# Patient Record
Sex: FEMALE | Race: WHITE | Employment: FULL TIME | URBAN - METROPOLITAN AREA
[De-identification: names, ages, dates, MRNs, and addresses within clinical notes are randomized per-mention and may not be internally consistent; named-entity substitution may affect disease eponyms.]

---

## 2023-02-02 ENCOUNTER — APPOINTMENT (OUTPATIENT)
Dept: GENERAL RADIOLOGY | Age: 23
End: 2023-02-02
Payer: COMMERCIAL

## 2023-02-02 ENCOUNTER — HOSPITAL ENCOUNTER (EMERGENCY)
Age: 23
Discharge: HOME | End: 2023-02-02
Attending: EMERGENCY MEDICINE
Payer: COMMERCIAL

## 2023-02-02 VITALS
HEIGHT: 62 IN | BODY MASS INDEX: 17.85 KG/M2 | DIASTOLIC BLOOD PRESSURE: 79 MMHG | WEIGHT: 97 LBS | RESPIRATION RATE: 18 BRPM | SYSTOLIC BLOOD PRESSURE: 126 MMHG | OXYGEN SATURATION: 99 % | TEMPERATURE: 98.1 F | HEART RATE: 73 BPM

## 2023-02-02 DIAGNOSIS — R42 LIGHTHEADEDNESS: Primary | ICD-10-CM

## 2023-02-02 LAB
ALBUMIN SERPL-MCNC: 4.3 G/DL (ref 3.5–5)
ALBUMIN/GLOB SERPL: 1.1 (ref 0.4–1.6)
ALP SERPL-CCNC: 73 U/L (ref 50–130)
ALT SERPL-CCNC: 21 U/L (ref 12–65)
ANION GAP SERPL CALC-SCNC: 1 MMOL/L (ref 2–11)
AST SERPL-CCNC: 22 U/L (ref 15–37)
BASOPHILS # BLD: 0.1 K/UL (ref 0–0.2)
BASOPHILS NFR BLD: 1 % (ref 0–2)
BILIRUB SERPL-MCNC: 0.3 MG/DL (ref 0.2–1.1)
BUN SERPL-MCNC: 12 MG/DL (ref 6–23)
CALCIUM SERPL-MCNC: 9.9 MG/DL (ref 8.3–10.4)
CHLORIDE SERPL-SCNC: 106 MMOL/L (ref 101–110)
CO2 SERPL-SCNC: 30 MMOL/L (ref 21–32)
CREAT SERPL-MCNC: 0.7 MG/DL (ref 0.6–1)
DIFFERENTIAL METHOD BLD: ABNORMAL
EKG ATRIAL RATE: 64 BPM
EKG DIAGNOSIS: NORMAL
EKG P AXIS: 72 DEGREES
EKG P-R INTERVAL: 136 MS
EKG Q-T INTERVAL: 404 MS
EKG QRS DURATION: 93 MS
EKG QTC CALCULATION (BAZETT): 414 MS
EKG R AXIS: 90 DEGREES
EKG T AXIS: 60 DEGREES
EKG VENTRICULAR RATE: 63 BPM
EOSINOPHIL # BLD: 0.6 K/UL (ref 0–0.8)
EOSINOPHIL NFR BLD: 7 % (ref 0.5–7.8)
ERYTHROCYTE [DISTWIDTH] IN BLOOD BY AUTOMATED COUNT: 13 % (ref 11.9–14.6)
GLOBULIN SER CALC-MCNC: 4 G/DL (ref 2.8–4.5)
GLUCOSE SERPL-MCNC: 93 MG/DL (ref 65–100)
HCG UR QL: NEGATIVE
HCT VFR BLD AUTO: 40 % (ref 35.8–46.3)
HGB BLD-MCNC: 12.8 G/DL (ref 11.7–15.4)
IMM GRANULOCYTES # BLD AUTO: 0 K/UL (ref 0–0.5)
IMM GRANULOCYTES NFR BLD AUTO: 0 % (ref 0–5)
LYMPHOCYTES # BLD: 3.5 K/UL (ref 0.5–4.6)
LYMPHOCYTES NFR BLD: 46 % (ref 13–44)
MCH RBC QN AUTO: 29.5 PG (ref 26.1–32.9)
MCHC RBC AUTO-ENTMCNC: 32 G/DL (ref 31.4–35)
MCV RBC AUTO: 92.2 FL (ref 82–102)
MONOCYTES # BLD: 0.5 K/UL (ref 0.1–1.3)
MONOCYTES NFR BLD: 7 % (ref 4–12)
NEUTS SEG # BLD: 2.9 K/UL (ref 1.7–8.2)
NEUTS SEG NFR BLD: 39 % (ref 43–78)
NRBC # BLD: 0 K/UL (ref 0–0.2)
PLATELET # BLD AUTO: 337 K/UL (ref 150–450)
PMV BLD AUTO: 10 FL (ref 9.4–12.3)
POTASSIUM SERPL-SCNC: 3.6 MMOL/L (ref 3.5–5.1)
PROT SERPL-MCNC: 8.3 G/DL (ref 6.3–8.2)
RBC # BLD AUTO: 4.34 M/UL (ref 4.05–5.2)
SODIUM SERPL-SCNC: 137 MMOL/L (ref 133–143)
TSH W FREE THYROID IF ABNORMAL: 2.36 UIU/ML (ref 0.36–3.74)
WBC # BLD AUTO: 7.6 K/UL (ref 4.3–11.1)

## 2023-02-02 PROCEDURE — 94761 N-INVAS EAR/PLS OXIMETRY MLT: CPT

## 2023-02-02 PROCEDURE — 84443 ASSAY THYROID STIM HORMONE: CPT

## 2023-02-02 PROCEDURE — 85025 COMPLETE CBC W/AUTO DIFF WBC: CPT

## 2023-02-02 PROCEDURE — 99285 EMERGENCY DEPT VISIT HI MDM: CPT

## 2023-02-02 PROCEDURE — 2580000003 HC RX 258

## 2023-02-02 PROCEDURE — 93005 ELECTROCARDIOGRAM TRACING: CPT | Performed by: EMERGENCY MEDICINE

## 2023-02-02 PROCEDURE — 71045 X-RAY EXAM CHEST 1 VIEW: CPT

## 2023-02-02 PROCEDURE — 80053 COMPREHEN METABOLIC PANEL: CPT

## 2023-02-02 PROCEDURE — 96360 HYDRATION IV INFUSION INIT: CPT

## 2023-02-02 PROCEDURE — 81025 URINE PREGNANCY TEST: CPT

## 2023-02-02 RX ORDER — 0.9 % SODIUM CHLORIDE 0.9 %
1000 INTRAVENOUS SOLUTION INTRAVENOUS ONCE
Status: COMPLETED | OUTPATIENT
Start: 2023-02-02 | End: 2023-02-02

## 2023-02-02 RX ADMIN — SODIUM CHLORIDE 1000 ML: 900 INJECTION, SOLUTION INTRAVENOUS at 17:44

## 2023-02-02 ASSESSMENT — PAIN SCALES - GENERAL: PAINLEVEL_OUTOF10: 1

## 2023-02-02 ASSESSMENT — ENCOUNTER SYMPTOMS
NAUSEA: 0
DIARRHEA: 0
VOMITING: 0
COLOR CHANGE: 0
ABDOMINAL PAIN: 0
CHEST TIGHTNESS: 0
SHORTNESS OF BREATH: 0

## 2023-02-02 ASSESSMENT — PAIN - FUNCTIONAL ASSESSMENT: PAIN_FUNCTIONAL_ASSESSMENT: 0-10

## 2023-02-02 NOTE — ED PROVIDER NOTES
Emergency Department Provider Note                   PCP:                None Provider               Age: 20 y.o. Sex: female       ICD-10-CM    1. Lightheadedness  [facility] - [address] Decision To Discharge 02/02/2023 06:31:39 PM        Medical Decision Making  29-year-old  female with history of anxiety presenting for evaluation of lightheadedness for the past several weeks. 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 States she occasionally feels some prodromal symptoms and presyncope but has not actually lost consciousness. Vital stable upon arrival.  Exam unremarkable. No neurological deficits. She is well-appearing and in no acute distress. Orthostatic vitals normal.  We will check EKG to rule out arrhythmia or ischemia, check basic labs to rule out metabolic derangement, check UA and hCG. We will also check TSH given prodromal hot flashes and cold intolerance. Do not believe there is any indication for imaging currently given normal vitals, neuro exam, timeline of symptoms, prodromal symptoms. We will reevaluate this decision after labs. Patient's lab work ultimately unrevealing. hCG negative. TSH normal.  UA normal.  EKG normal.  Patient was given fluids and is feeling somewhat better. Unclear etiology as to symptoms. Potentially vasovagal, transient hypoglycemia, dehydration. Low suspicion for intracranial process therefore we will defer imaging at this time. Advised adequate hydration, frequent meals, follow-up with primary care. Return precautions discussed. Patient verbalizes understanding and is agreeable with this plan. Amount and/or Complexity of Data Reviewed  Labs: ordered. Radiology: ordered and independent interpretation performed.      Details: Agree with radiologist - no acute abnormalities  ECG/medicine tests: ordered and independent interpretation performed. Details: EKG interpretation:  Normal sinus rhythm at a ventricular rate of 63. Normal axis. Normal QTc. Normal ST-T segments. Risk  Prescription drug management. Complexity of Problem: 1 stable, acute illness. (3)    I have conducted an independent ordering and review of Labs. I have conducted an independent ordering and review of EKG. I have conducted an independent ordering and review of X-rays. Considerations: Shared decision making was utilized in the care of this patient. Considerations: The following labs and/or imaging studies were considered but not ordered: CT head      ED Course as of 02/02/23 1845   Thu Feb 02, 2023   1640 EKG interpretation:  Normal sinus rhythm at a ventricular rate of 63. Normal axis. Normal QTc. Normal ST-T segments. [LB]      ED Course User Index  [LB] CHEYENNE Davis        Orders Placed This Encounter   Procedures    XR CHEST PORTABLE    CBC with Auto Differential    Comprehensive Metabolic Panel    TSH with Reflex    [id number] [facility] - [address], [place]    Cardiac Monitor - ED Only    Continuous Pulse Oximetry    Orthostatic blood pressure and pulse    POC Urine Preg    POC Pregnancy Urine Qual    EKG 12 Lead    Saline lock IV        Medications   0.9 % sodium chloride bolus (0 mLs IntraVENous Stopped 2/2/23 1844)       New Prescriptions    No medications on file        Rubén Park is a 20 y.o. female who presents to the Emergency Department with chief complaint of    Chief Complaint   Patient presents with    Dizziness      Patient is a 50-year-old  male with history of anxiety presenting to the emergency department for evaluation of lightheadedness. States she has been feeling some lightheadedness with prolonged standing for the past several weeks. 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   States she occasionally feels some prodromal \"hot flashes\" and vision darkening and states occasionally she feels she is going to pass out but has not fully lost consciousness. States she usually has to sit down and her symptoms resolved. States she initially thought she may have low blood sugar therefore she has been eating more frequently lately which has not helped. She denies any vertigo or disequilibrium. Denies any weakness. She denies fever or chills. She denies any other exacerbating or relieving factors. She has no other complaints today. The history is provided by the patient. Review of Systems   Constitutional:  Negative for chills, fatigue and fever. Eyes:  Negative for visual disturbance. Respiratory:  Negative for chest tightness and shortness of breath. Cardiovascular:  Negative for chest pain and palpitations. Gastrointestinal:  Negative for abdominal pain, diarrhea, nausea and vomiting. Genitourinary:  Negative for dysuria. Musculoskeletal:  Negative for arthralgias and myalgias. Skin:  Negative for color change and wound. Neurological:  Positive for light-headedness. Negative for dizziness, syncope, weakness and headaches. All other systems reviewed and are negative. Past Medical History:   Diagnosis Date    ADHD     Anxiety     Panic attacks         History reviewed. No pertinent surgical history. History reviewed. No pertinent family history. Social History     Socioeconomic History    Marital status: Single     Spouse name: None    Number of children: None    Years of education: None    Highest education level: None         Benadryl [diphenhydramine]     Previous Medications    No medications on file        Vitals signs and nursing note reviewed.    Patient Vitals for the past 4 hrs:   Temp Pulse Resp BP SpO2   02/02/23 1830 -- 73 18 126/79 99 %   02/02/23 1732 -- 58 16 108/74 --   02/02/23 1731 -- 57 15 110/80 100 %   02/02/23 1620 98.1 °F (36.7 °C) 68 16 121/67 98 %          Physical Exam  Vitals and nursing note reviewed. Constitutional:       General: She is not in acute distress. Appearance: Normal appearance. She is not ill-appearing. HENT:      Head: Normocephalic and atraumatic. Right Ear: External ear normal.      Left Ear: External ear normal.      Nose: Nose normal.   Eyes:      General: No scleral icterus. Right eye: No discharge. Left eye: No discharge. Extraocular Movements: Extraocular movements intact. Conjunctiva/sclera: Conjunctivae normal.      Pupils: Pupils are equal, round, and reactive to light. Cardiovascular:      Rate and Rhythm: Normal rate and regular rhythm. Pulses: Normal pulses. Heart sounds: Normal heart sounds. Pulmonary:      Effort: Pulmonary effort is normal.      Breath sounds: Normal breath sounds. Abdominal:      General: Abdomen is flat. Palpations: Abdomen is soft. Tenderness: There is no abdominal tenderness. Musculoskeletal:         General: Normal range of motion. Cervical back: Normal range of motion and neck supple. Skin:     General: Skin is warm and dry. Neurological:      General: No focal deficit present. Mental Status: She is alert and oriented to person, place, and time. Cranial Nerves: No cranial nerve deficit. Sensory: No sensory deficit. Motor: No weakness. Coordination: Coordination normal.      Gait: Gait normal.   Psychiatric:         Mood and Affect: Mood normal.         Behavior: Behavior normal.        Procedures    Results for orders placed or performed during the hospital encounter of 02/02/23   XR CHEST PORTABLE    Narrative    Chest X-ray    INDICATION: Dizziness, syncope    AP/PA view of the chest was obtained. FINDINGS: The lungs are clear. There are no infiltrates or effusions. The heart  size is normal.  The bony thorax is intact.         Impression    No acute findings in the chest     CBC with Auto Differential   Result Value Ref Range    WBC 7.6 4.3 - 11.1 K/uL    RBC 4.34 4.05 - 5.2 M/uL    Hemoglobin 12.8 11.7 - 15.4 g/dL    Hematocrit 40.0 35.8 - 46.3 %    MCV 92.2 82.0 - 102.0 FL    MCH 29.5 26.1 - 32.9 PG    MCHC 32.0 31.4 - 35.0 g/dL    RDW 13.0 11.9 - 14.6 %    Platelets 709 442 - 773 K/uL    MPV 10.0 9.4 - 12.3 FL    nRBC 0.00 0.0 - 0.2 K/uL    Differential Type AUTOMATED      Seg Neutrophils 39 (L) 43 - 78 %    Lymphocytes 46 (H) 13 - 44 %    Monocytes 7 4.0 - 12.0 %    Eosinophils % 7 0.5 - 7.8 %    Basophils 1 0.0 - 2.0 %    Immature Granulocytes 0 0.0 - 5.0 %    Segs Absolute 2.9 1.7 - 8.2 K/UL    Absolute Lymph # 3.5 0.5 - 4.6 K/UL    Absolute Mono # 0.5 0.1 - 1.3 K/UL    Absolute Eos # 0.6 0.0 - 0.8 K/UL    Basophils Absolute 0.1 0.0 - 0.2 K/UL    Absolute Immature Granulocyte 0.0 0.0 - 0.5 K/UL   Comprehensive Metabolic Panel   Result Value Ref Range    Sodium 137 133 - 143 mmol/L    Potassium 3.6 3.5 - 5.1 mmol/L    Chloride 106 101 - 110 mmol/L    CO2 30 21 - 32 mmol/L    Anion Gap 1 (L) 2 - 11 mmol/L    Glucose 93 65 - 100 mg/dL    BUN 12 6 - 23 MG/DL    Creatinine 0.70 0.6 - 1.0 MG/DL    Est, Glom Filt Rate >60 >60 ml/min/1.73m2    Calcium 9.9 8.3 - 10.4 MG/DL    Total Bilirubin 0.3 0.2 - 1.1 MG/DL    ALT 21 12 - 65 U/L    AST 22 15 - 37 U/L    Alk Phosphatase 73 50 - 130 U/L    Total Protein 8.3 (H) 6.3 - 8.2 g/dL    Albumin 4.3 3.5 - 5.0 g/dL    Globulin 4.0 2.8 - 4.5 g/dL    Albumin/Globulin Ratio 1.1 0.4 - 1.6     TSH with Reflex   Result Value Ref Range    TSH w Free Thyroid if Abnormal 2.36 0.358 - 3.740 UIU/ML   POC Pregnancy Urine Qual   Result Value Ref Range    Preg Test, Ur Negative NEG          XR CHEST PORTABLE   Final Result   No acute findings in the chest              NIH Stroke Scale  Interval: Baseline  Level of Consciousness (1a): Alert  LOC Questions (1b):  Answers both correctly  LOC Commands (1c): Performs both tasks correctly  Best Gaze (2): Normal  Visual (3): No visual loss  Facial Palsy (4): Normal symmetrical movement  Motor Arm, Left (5a): No drift  Motor Arm, Right (5b): No drift  Motor Leg, Left (6a): No drift  Motor Leg, Right (6b): No drift  Limb Ataxia (7): Absent  Sensory (8): Normal  Best Language (9): No aphasia  Dysarthria (10): Normal  Extinction and Inattention (11): No abnormality  Total: 0              Voice dictation software was used during the making of this note. This software is not perfect and grammatical and other typographical errors may be present. This note has not been completely proofread for errors.       Haylee Herman  02/02/23 9029

## 2023-02-02 NOTE — ED NOTES
I have reviewed discharge instructions with the patient. The patient verbalized understanding. Patient left ED via Discharge Method: ambulatory to Home with self    Opportunity for questions and clarification provided. Patient given 0 scripts. To continue your aftercare when you leave the hospital, you may receive an automated call from our care team to check in on how you are doing. This is a free service and part of our promise to provide the best care and service to meet your aftercare needs.  If you have questions, or wish to unsubscribe from this service please call [phone]. Thank you for Choosing our [facility].         [address] [place] [address]  02/02/23 1335

## 2023-02-02 NOTE — DISCHARGE INSTRUCTIONS
You were evaluated today in the emergency department with lightheadedness. Vital signs today were normal.  Your blood work today looked good and did not show any signs of anemia, infection, organ dysfunction, or electrolyte disturbance. Your thyroid level is normal.  Your pregnancy test was normal.  No signs of urinary tract infection. Your EKG looked good and did not show any signs of blockages or arrhythmia. Your blood pressure did not drop when standing. We gave you some fluids in the emergency department. Unclear as to cause of your symptoms. Advise following up with primary care. I placed a referral to [facility] primary care on [address]. They should reach out to you and give you a call. This may expedite an appointment to primary care, however, it may not. Plan to keep your appointment in March for now. For now, be sure to hydrate well and eat small snacks throughout the day. Please return with any worsening symptoms or concerns otherwise.

## 2023-02-23 ENCOUNTER — OFFICE VISIT (OUTPATIENT)
Dept: PRIMARY CARE CLINIC | Facility: CLINIC | Age: 23
End: 2023-02-23
Payer: COMMERCIAL

## 2023-02-23 VITALS
HEART RATE: 91 BPM | WEIGHT: 101 LBS | SYSTOLIC BLOOD PRESSURE: 107 MMHG | DIASTOLIC BLOOD PRESSURE: 80 MMHG | BODY MASS INDEX: 18.58 KG/M2 | OXYGEN SATURATION: 96 % | HEIGHT: 62 IN

## 2023-02-23 DIAGNOSIS — R00.0 TACHYCARDIA: ICD-10-CM

## 2023-02-23 DIAGNOSIS — H53.8 BLURRY VISION: ICD-10-CM

## 2023-02-23 DIAGNOSIS — R42 DIZZINESS: Primary | ICD-10-CM

## 2023-02-23 DIAGNOSIS — Z76.89 ENCOUNTER TO ESTABLISH CARE WITH NEW DOCTOR: ICD-10-CM

## 2023-02-23 DIAGNOSIS — R23.2 ABNORMAL FLUSHING AND SWEATING: ICD-10-CM

## 2023-02-23 DIAGNOSIS — R61 ABNORMAL FLUSHING AND SWEATING: ICD-10-CM

## 2023-02-23 PROCEDURE — 99203 OFFICE O/P NEW LOW 30 MIN: CPT | Performed by: FAMILY MEDICINE

## 2023-02-23 RX ORDER — BLOOD PRESSURE TEST KIT
1 KIT MISCELLANEOUS DAILY
Qty: 1 KIT | Refills: 0 | Status: SHIPPED | OUTPATIENT
Start: 2023-02-23

## 2023-02-23 SDOH — ECONOMIC STABILITY: INCOME INSECURITY: HOW HARD IS IT FOR YOU TO PAY FOR THE VERY BASICS LIKE FOOD, HOUSING, MEDICAL CARE, AND HEATING?: SOMEWHAT HARD

## 2023-02-23 SDOH — ECONOMIC STABILITY: FOOD INSECURITY: WITHIN THE PAST 12 MONTHS, YOU WORRIED THAT YOUR FOOD WOULD RUN OUT BEFORE YOU GOT MONEY TO BUY MORE.: SOMETIMES TRUE

## 2023-02-23 SDOH — ECONOMIC STABILITY: HOUSING INSECURITY
IN THE LAST 12 MONTHS, WAS THERE A TIME WHEN YOU DID NOT HAVE A STEADY PLACE TO SLEEP OR SLEPT IN A SHELTER (INCLUDING NOW)?: NO

## 2023-02-23 SDOH — ECONOMIC STABILITY: FOOD INSECURITY: WITHIN THE PAST 12 MONTHS, THE FOOD YOU BOUGHT JUST DIDN'T LAST AND YOU DIDN'T HAVE MONEY TO GET MORE.: NEVER TRUE

## 2023-02-23 ASSESSMENT — ENCOUNTER SYMPTOMS
SHORTNESS OF BREATH: 0
NAUSEA: 0
CHEST TIGHTNESS: 0
VOMITING: 0
EYE PAIN: 0
SINUS PAIN: 0
CONSTIPATION: 0
RHINORRHEA: 0
BLOOD IN STOOL: 0
ABDOMINAL PAIN: 0
EYE REDNESS: 0
SINUS PRESSURE: 0
SORE THROAT: 0
DIARRHEA: 0
BACK PAIN: 0
WHEEZING: 0
COUGH: 0

## 2023-02-23 ASSESSMENT — PATIENT HEALTH QUESTIONNAIRE - PHQ9
6. FEELING BAD ABOUT YOURSELF - OR THAT YOU ARE A FAILURE OR HAVE LET YOURSELF OR YOUR FAMILY DOWN: 0
4. FEELING TIRED OR HAVING LITTLE ENERGY: 3
SUM OF ALL RESPONSES TO PHQ QUESTIONS 1-9: 16
SUM OF ALL RESPONSES TO PHQ QUESTIONS 1-9: 16
5. POOR APPETITE OR OVEREATING: 1
2. FEELING DOWN, DEPRESSED OR HOPELESS: 1
10. IF YOU CHECKED OFF ANY PROBLEMS, HOW DIFFICULT HAVE THESE PROBLEMS MADE IT FOR YOU TO DO YOUR WORK, TAKE CARE OF THINGS AT HOME, OR GET ALONG WITH OTHER PEOPLE: 2
1. LITTLE INTEREST OR PLEASURE IN DOING THINGS: 2
8. MOVING OR SPEAKING SO SLOWLY THAT OTHER PEOPLE COULD HAVE NOTICED. OR THE OPPOSITE, BEING SO FIGETY OR RESTLESS THAT YOU HAVE BEEN MOVING AROUND A LOT MORE THAN USUAL: 3
9. THOUGHTS THAT YOU WOULD BE BETTER OFF DEAD, OR OF HURTING YOURSELF: 0
7. TROUBLE CONCENTRATING ON THINGS, SUCH AS READING THE NEWSPAPER OR WATCHING TELEVISION: 3
SUM OF ALL RESPONSES TO PHQ9 QUESTIONS 1 & 2: 3
SUM OF ALL RESPONSES TO PHQ QUESTIONS 1-9: 16
3. TROUBLE FALLING OR STAYING ASLEEP: 3
SUM OF ALL RESPONSES TO PHQ QUESTIONS 1-9: 16

## 2023-02-23 NOTE — PROGRESS NOTES
[facility]  [address] [place] [address], [address]  Phone [phone]  Fax [phone]      Patient: Xavi Che  YOB: 2000  Patient Age 20 y.o. Patient sex: female  Medical Record:  [id number]  Author:  Carlos Cuevas DO    Family Practice Progress Note     Chief Complaint   Patient presents with    New Patient     Last physical 2018. Dizziness     Sweating, blurry vision and dizziness x 3 yrs ( intermittent ) this episode x 2 months. HX of anxiety and panic attacks        History of Present Illness:  Xavi Che is a 20 y.o. female with past medical history of anxiety and panic attacks seen today as a new pt to [facility] care and also address concern. She states that she has previous history of anxiety and panic attacks. She feels like it is controlled and managed by herself. She works at hospital system with physicians during procedures. She states that for past several months she has noticed sudden onset of dizziness along with closing and of vision and sweating sensation. She feels like it occurs suddenly while she is standing and doing something. The last time it occurred she was with the physician doing a procedure and had to step back and be replaced by another person due to her symptoms. She sat down and after few minutes it got better. She does not relate it to anxiety or panic attacks. She states whenever it happens that she is feeling fine and normal and it occurs suddenly. She states this has been going on for past 3 years but has been getting worse over the past several months. She had also been to the emergency department with same concern and had work-up for it. Had blood work and imaging done. No cause was found at the hospital for it. She states that she has been monitoring her heart rate to see how she is doing. She has a log of it, see below. She feels like she may have POTS.   Has not checked her blood pressure or blood glucose levels during the episodes in the past.  No other associated symptoms. Denies all other review of systems. No other concerns or complaints. Every minute Lying Down HR (68-75):  70, 70, 73, 71, 71    Every 30 seconds HR ():  102, 97, 104, 105    At work: Walking average: 108; Resting average: 68-72    At home: Napping laying down - 65; Sitting - 70-80; Immediately to standing - up to 127      Allergies: Allergies   Allergen Reactions    Benadryl [Diphenhydramine] Dizziness or Vertigo       Current Medications:   Medications marked \"taking\" at this time:  Current Outpatient Medications   Medication Sig Dispense Refill    Blood Pressure KIT 1 Units by Does not apply route daily 1 kit 0    blood glucose monitor kit and supplies Dispense sufficient amount for indicated testing frequency plus additional to accommodate PRN testing needs. Dispense all needed supplies to include: monitor, strips, lancing device, lancets, control solutions, alcohol swabs. Once a day frequency for 30 day supply. 1 kit 0     No current facility-administered medications for this visit. Current Problem List: There is no problem list on file for this patient. Past Medical History:   Past Medical History:   Diagnosis Date    ADHD     Anxiety     Panic attacks        Social History:   Social History     Tobacco Use    Smoking status: Never    Smokeless tobacco: Never   Substance Use Topics    Alcohol use:  Yes     Alcohol/week: 2.0 standard drinks     Types: 1 Glasses of wine, 1 Cans of beer per week     Comment: ocassional       Family History:   Family History   Problem Relation Age of Onset    Other Mother         Skin cancer    Migraines Mother     No Known Problems Father        Surgical History:  Past Surgical History:   Procedure Laterality Date    WISDOM TOOTH EXTRACTION      x 3       Past medical history, Past surgical history, Family history, Social history, Allergies and Medications were all reviewed with the patient today and updated as necessary. ROS:  Review of Systems   Constitutional:  Negative for appetite change, chills, fatigue, fever and unexpected weight change. HENT:  Negative for congestion, ear discharge, ear pain, postnasal drip, rhinorrhea, sinus pressure, sinus pain, sneezing and sore throat. Eyes:  Positive for visual disturbance. Negative for pain and redness. Respiratory:  Negative for cough, chest tightness, shortness of breath and wheezing. Cardiovascular:  Negative for chest pain, palpitations and leg swelling. Gastrointestinal:  Negative for abdominal pain, blood in stool, constipation, diarrhea, nausea and vomiting. Musculoskeletal:  Negative for arthralgias, back pain, gait problem, neck pain and neck stiffness. Skin:  Negative for rash and wound. Neurological:  Positive for dizziness. Negative for tremors, syncope, weakness, numbness and headaches. Psychiatric/Behavioral:  Negative for behavioral problems, confusion, self-injury, sleep disturbance and suicidal ideas. The patient is not nervous/anxious. Denies Depression   All other systems reviewed and are negative. /80 (Site: Right Upper Arm, Position: Sitting, Cuff Size: Medium Adult)   Pulse 91   Ht 5' 2.4\" (1.585 m)   Wt 101 lb (45.8 kg)   LMP 01/24/2023 (Approximate)   SpO2 96%   BMI 18.24 kg/m²   Body mass index is 18.24 kg/m². Physical Exam:  Physical Exam  Vitals and nursing note reviewed. Constitutional:       General: She is not in acute distress. Appearance: Normal appearance. She is well-developed, well-groomed and underweight. She is not ill-appearing or toxic-appearing. HENT:      Head: Normocephalic and atraumatic. Right Ear: Hearing, tympanic membrane, ear canal and external ear normal.      Left Ear: Hearing, tympanic membrane, ear canal and external ear normal.      Nose: Nose normal. No congestion or rhinorrhea. Mouth/Throat:      Lips: Pink. No lesions. Mouth: Mucous membranes are moist.      Pharynx: Oropharynx is clear. Uvula midline. No pharyngeal swelling, oropharyngeal exudate, posterior oropharyngeal erythema or uvula swelling. Tonsils: No tonsillar exudate or tonsillar abscesses. Eyes:      General: No scleral icterus. Conjunctiva/sclera: Conjunctivae normal.      Pupils: Pupils are equal, round, and reactive to light. Cardiovascular:      Rate and Rhythm: Normal rate and regular rhythm. Pulses: Normal pulses. Heart sounds: Normal heart sounds. No murmur heard. No friction rub. No gallop. Pulmonary:      Effort: Pulmonary effort is normal. No respiratory distress. Breath sounds: Normal breath sounds. No wheezing, rhonchi or rales. Abdominal:      General: Bowel sounds are normal. There is no distension. Palpations: Abdomen is soft. Tenderness: There is no abdominal tenderness. Musculoskeletal:         General: No swelling, tenderness or deformity. Normal range of motion. Cervical back: Normal range of motion and neck supple. No rigidity or tenderness. Lymphadenopathy:      Cervical: No cervical adenopathy. Skin:     General: Skin is warm. Findings: No bruising, erythema or rash. Neurological:      General: No focal deficit present. Mental Status: She is alert and oriented to person, place, and time. Mental status is at baseline. Cranial Nerves: No cranial nerve deficit. Sensory: No sensory deficit. Motor: No weakness. Coordination: Coordination normal.      Gait: Gait normal.   Psychiatric:         Mood and Affect: Mood normal.         Behavior: Behavior normal. Behavior is cooperative. Thought Content: Thought content normal.         Judgment: Judgment normal.        No results found for this visit on 02/23/23. Medical problems and Test results were reviewed with the patient today.     Assessment/Plan:  Diagnoses and all orders for this visit:  Dizziness  -     External Referral To Cardiology  -     Blood Pressure KIT; 1 Units by Does not apply route daily  -     blood glucose monitor kit and supplies; Dispense sufficient amount for indicated testing frequency plus additional to accommodate PRN testing needs. Dispense all needed supplies to include: monitor, strips, lancing device, lancets, control solutions, alcohol swabs. Once a day frequency for 30 day supply. Blurry vision  -     External Referral To Cardiology  -     Blood Pressure KIT; 1 Units by Does not apply route daily  -     blood glucose monitor kit and supplies; Dispense sufficient amount for indicated testing frequency plus additional to accommodate PRN testing needs. Dispense all needed supplies to include: monitor, strips, lancing device, lancets, control solutions, alcohol swabs. Once a day frequency for 30 day supply. Abnormal flushing and sweating  -     External Referral To Cardiology  -     Blood Pressure KIT; 1 Units by Does not apply route daily  -     blood glucose monitor kit and supplies; Dispense sufficient amount for indicated testing frequency plus additional to accommodate PRN testing needs. Dispense all needed supplies to include: monitor, strips, lancing device, lancets, control solutions, alcohol swabs. Once a day frequency for 30 day supply. Tachycardia  -     External Referral To Cardiology  -     Blood Pressure KIT; 1 Units by Does not apply route daily  -     blood glucose monitor kit and supplies; Dispense sufficient amount for indicated testing frequency plus additional to accommodate PRN testing needs. Dispense all needed supplies to include: monitor, strips, lancing device, lancets, control solutions, alcohol swabs. Once a day frequency for 30 day supply. Encounter to establish care with new doctor     Previous ekg visit + note + lab + imaging reviewed. Ddx discussed. Nature and course discussed.  Advised to keep BP + BG log, during the episodes as well. Rx for it sent. Referral placed to see cardiologist. Kathy Corona if any si/sx worsens then to RTC sooner. All questions and concerns answered. Patient voiced understanding and agrees with POC. Chronic condition/Plan:  No problem-specific Assessment & Plan notes found for this encounter. Follow up:  Return in about 8 weeks (around 4/20/2023) for F/u. Elements of this note have been dictated using speech recognition software. As a result, errors of speech recognition may have occurred.      [address] [facility], DO

## 2023-04-26 ENCOUNTER — OFFICE VISIT (OUTPATIENT)
Dept: PRIMARY CARE CLINIC | Facility: CLINIC | Age: 23
End: 2023-04-26
Payer: COMMERCIAL

## 2023-04-26 VITALS
BODY MASS INDEX: 19.32 KG/M2 | HEIGHT: 62 IN | TEMPERATURE: 98.6 F | SYSTOLIC BLOOD PRESSURE: 110 MMHG | OXYGEN SATURATION: 94 % | WEIGHT: 105 LBS | DIASTOLIC BLOOD PRESSURE: 70 MMHG | HEART RATE: 59 BPM

## 2023-04-26 DIAGNOSIS — R00.0 TACHYCARDIA: ICD-10-CM

## 2023-04-26 DIAGNOSIS — R42 DIZZINESS: Primary | ICD-10-CM

## 2023-04-26 PROCEDURE — 99213 OFFICE O/P EST LOW 20 MIN: CPT | Performed by: FAMILY MEDICINE

## 2023-04-26 RX ORDER — CETIRIZINE HYDROCHLORIDE 10 MG/1
10 TABLET ORAL DAILY
COMMUNITY

## 2023-04-26 ASSESSMENT — PATIENT HEALTH QUESTIONNAIRE - PHQ9
SUM OF ALL RESPONSES TO PHQ QUESTIONS 1-9: 12
9. THOUGHTS THAT YOU WOULD BE BETTER OFF DEAD, OR OF HURTING YOURSELF: 0
4. FEELING TIRED OR HAVING LITTLE ENERGY: 3
1. LITTLE INTEREST OR PLEASURE IN DOING THINGS: 2
SUM OF ALL RESPONSES TO PHQ9 QUESTIONS 1 & 2: 3
5. POOR APPETITE OR OVEREATING: 0
3. TROUBLE FALLING OR STAYING ASLEEP: 3
SUM OF ALL RESPONSES TO PHQ QUESTIONS 1-9: 12
SUM OF ALL RESPONSES TO PHQ QUESTIONS 1-9: 12
2. FEELING DOWN, DEPRESSED OR HOPELESS: 1
SUM OF ALL RESPONSES TO PHQ QUESTIONS 1-9: 12
7. TROUBLE CONCENTRATING ON THINGS, SUCH AS READING THE NEWSPAPER OR WATCHING TELEVISION: 0
6. FEELING BAD ABOUT YOURSELF - OR THAT YOU ARE A FAILURE OR HAVE LET YOURSELF OR YOUR FAMILY DOWN: 0
10. IF YOU CHECKED OFF ANY PROBLEMS, HOW DIFFICULT HAVE THESE PROBLEMS MADE IT FOR YOU TO DO YOUR WORK, TAKE CARE OF THINGS AT HOME, OR GET ALONG WITH OTHER PEOPLE: 1
8. MOVING OR SPEAKING SO SLOWLY THAT OTHER PEOPLE COULD HAVE NOTICED. OR THE OPPOSITE, BEING SO FIGETY OR RESTLESS THAT YOU HAVE BEEN MOVING AROUND A LOT MORE THAN USUAL: 3

## 2023-05-02 ENCOUNTER — TELEPHONE (OUTPATIENT)
Dept: PRIMARY CARE CLINIC | Facility: CLINIC | Age: 23
End: 2023-05-02

## 2023-05-02 NOTE — TELEPHONE ENCOUNTER
Returned call to patient, no answer. Left message to please call us back. As per , patient needs to wait for her cardiologist appointment.

## 2023-05-02 NOTE — TELEPHONE ENCOUNTER
----- Message from Ramiro Lutz, [address] sent at 4/28/2023  2:23 PM EDT -----  Subject: Message to Provider    QUESTIONS  Information for Provider? Patient calling in to notify Dr. Abby Maldonado that   she will need to call the Cath Lab to schedule the tilt table test. Per   the Cath Lab they cannot schedule the patient.   ---------------------------------------------------------------------------  --------------  Stacy Bazzi INFO  [phone]; OK to leave message on voicemail  ---------------------------------------------------------------------------  --------------  SCRIPT ANSWERS  Relationship to Patient?  Self

## 2023-05-03 ASSESSMENT — ENCOUNTER SYMPTOMS
DIARRHEA: 0
COUGH: 0
SORE THROAT: 0
EYE REDNESS: 0
SHORTNESS OF BREATH: 0
CONSTIPATION: 0
NAUSEA: 0
SINUS PAIN: 0
SINUS PRESSURE: 0
BLOOD IN STOOL: 0
BACK PAIN: 0
VOMITING: 0
CHEST TIGHTNESS: 0
WHEEZING: 0
RHINORRHEA: 0
ABDOMINAL PAIN: 0
EYE PAIN: 0